# Patient Record
Sex: MALE | Race: WHITE | NOT HISPANIC OR LATINO | Employment: UNEMPLOYED | URBAN - NONMETROPOLITAN AREA
[De-identification: names, ages, dates, MRNs, and addresses within clinical notes are randomized per-mention and may not be internally consistent; named-entity substitution may affect disease eponyms.]

---

## 2022-04-05 ENCOUNTER — TELEPHONE (OUTPATIENT)
Dept: FAMILY MEDICINE CLINIC | Facility: CLINIC | Age: 27
End: 2022-04-05

## 2022-04-05 DIAGNOSIS — L65.9 ALOPECIA: ICD-10-CM

## 2022-04-05 DIAGNOSIS — F98.8 ATTENTION DEFICIT DISORDER (ADD) WITHOUT HYPERACTIVITY: Primary | ICD-10-CM

## 2022-04-05 RX ORDER — FINASTERIDE 1 MG/1
1 TABLET, FILM COATED ORAL DAILY
Qty: 90 TABLET | Refills: 1 | Status: SHIPPED | OUTPATIENT
Start: 2022-04-05 | End: 2023-03-09 | Stop reason: SDUPTHER

## 2022-04-05 RX ORDER — DEXTROAMPHETAMINE SACCHARATE, AMPHETAMINE ASPARTATE, DEXTROAMPHETAMINE SULFATE AND AMPHETAMINE SULFATE 7.5; 7.5; 7.5; 7.5 MG/1; MG/1; MG/1; MG/1
30 TABLET ORAL 2 TIMES DAILY
Qty: 60 TABLET | Refills: 0 | Status: SHIPPED | OUTPATIENT
Start: 2022-04-05 | End: 2022-04-05 | Stop reason: SDUPTHER

## 2022-04-05 RX ORDER — DEXTROAMPHETAMINE SACCHARATE, AMPHETAMINE ASPARTATE, DEXTROAMPHETAMINE SULFATE AND AMPHETAMINE SULFATE 7.5; 7.5; 7.5; 7.5 MG/1; MG/1; MG/1; MG/1
30 TABLET ORAL 2 TIMES DAILY
Qty: 60 TABLET | Refills: 0 | OUTPATIENT
Start: 2022-04-05 | End: 2022-04-14

## 2022-04-05 RX ORDER — FINASTERIDE 1 MG/1
1 TABLET, FILM COATED ORAL DAILY
COMMUNITY
End: 2022-04-05 | Stop reason: SDUPTHER

## 2022-04-05 NOTE — TELEPHONE ENCOUNTER
Patient called requesting a refill of dextroamphetamine-amphetamine 30mg be sent to [facility] [address] in [place]

## 2022-05-06 RX ORDER — DEXTROAMPHETAMINE SACCHARATE, AMPHETAMINE ASPARTATE, DEXTROAMPHETAMINE SULFATE AND AMPHETAMINE SULFATE 7.5; 7.5; 7.5; 7.5 MG/1; MG/1; MG/1; MG/1
30 TABLET ORAL DAILY
Qty: 30 TABLET | Refills: 0 | Status: CANCELLED | OUTPATIENT
Start: 2022-05-06 | End: 2022-06-05

## 2022-05-12 ENCOUNTER — OFFICE VISIT (OUTPATIENT)
Dept: FAMILY MEDICINE CLINIC | Facility: CLINIC | Age: 27
End: 2022-05-12

## 2022-05-12 DIAGNOSIS — F98.8 ATTENTION DEFICIT DISORDER (ADD) WITHOUT HYPERACTIVITY: ICD-10-CM

## 2022-05-12 DIAGNOSIS — L65.9 ALOPECIA: ICD-10-CM

## 2022-05-12 DIAGNOSIS — F41.9 ANXIETY: Primary | ICD-10-CM

## 2022-05-12 PROCEDURE — 99442 PR PHYS/QHP TELEPHONE EVALUATION 11-20 MIN: CPT | Performed by: FAMILY MEDICINE

## 2022-05-12 RX ORDER — DEXTROAMPHETAMINE SACCHARATE, AMPHETAMINE ASPARTATE, DEXTROAMPHETAMINE SULFATE AND AMPHETAMINE SULFATE 7.5; 7.5; 7.5; 7.5 MG/1; MG/1; MG/1; MG/1
1 TABLET ORAL EVERY 12 HOURS
Qty: 60 TABLET | Refills: 0 | Status: SHIPPED | OUTPATIENT
Start: 2022-05-12 | End: 2022-06-08 | Stop reason: SDUPTHER

## 2022-06-08 DIAGNOSIS — F98.8 ATTENTION DEFICIT DISORDER (ADD) WITHOUT HYPERACTIVITY: ICD-10-CM

## 2022-06-08 RX ORDER — DEXTROAMPHETAMINE SACCHARATE, AMPHETAMINE ASPARTATE, DEXTROAMPHETAMINE SULFATE AND AMPHETAMINE SULFATE 7.5; 7.5; 7.5; 7.5 MG/1; MG/1; MG/1; MG/1
1 TABLET ORAL EVERY 12 HOURS
Qty: 60 TABLET | Refills: 0 | Status: SHIPPED | OUTPATIENT
Start: 2022-06-08 | End: 2022-07-07 | Stop reason: SDUPTHER

## 2022-06-08 NOTE — TELEPHONE ENCOUNTER
VARGHESE PATIENT     [facility] [place] [id number] - [place], [place] - [address] AT [facility] [address] - [phone] PH - [phone] FX Phone:  [phone]   Fax:  [phone]

## 2022-07-07 DIAGNOSIS — F98.8 ATTENTION DEFICIT DISORDER (ADD) WITHOUT HYPERACTIVITY: ICD-10-CM

## 2022-07-07 RX ORDER — DEXTROAMPHETAMINE SACCHARATE, AMPHETAMINE ASPARTATE, DEXTROAMPHETAMINE SULFATE AND AMPHETAMINE SULFATE 7.5; 7.5; 7.5; 7.5 MG/1; MG/1; MG/1; MG/1
1 TABLET ORAL EVERY 12 HOURS
Qty: 60 TABLET | Refills: 0 | Status: CANCELLED | OUTPATIENT
Start: 2022-07-07

## 2022-07-07 RX ORDER — DEXTROAMPHETAMINE SACCHARATE, AMPHETAMINE ASPARTATE, DEXTROAMPHETAMINE SULFATE AND AMPHETAMINE SULFATE 7.5; 7.5; 7.5; 7.5 MG/1; MG/1; MG/1; MG/1
1 TABLET ORAL EVERY 12 HOURS
Qty: 60 TABLET | Refills: 0 | Status: SHIPPED | OUTPATIENT
Start: 2022-07-07 | End: 2022-08-08 | Stop reason: SDUPTHER

## 2022-07-08 ENCOUNTER — TELEPHONE (OUTPATIENT)
Dept: FAMILY MEDICINE CLINIC | Facility: CLINIC | Age: 27
End: 2022-07-08

## 2022-08-08 DIAGNOSIS — F98.8 ATTENTION DEFICIT DISORDER (ADD) WITHOUT HYPERACTIVITY: ICD-10-CM

## 2022-08-08 RX ORDER — DEXTROAMPHETAMINE SACCHARATE, AMPHETAMINE ASPARTATE, DEXTROAMPHETAMINE SULFATE AND AMPHETAMINE SULFATE 7.5; 7.5; 7.5; 7.5 MG/1; MG/1; MG/1; MG/1
1 TABLET ORAL EVERY 12 HOURS
Qty: 60 TABLET | Refills: 0 | Status: CANCELLED | OUTPATIENT
Start: 2022-08-08

## 2022-08-08 RX ORDER — DEXTROAMPHETAMINE SACCHARATE, AMPHETAMINE ASPARTATE, DEXTROAMPHETAMINE SULFATE AND AMPHETAMINE SULFATE 7.5; 7.5; 7.5; 7.5 MG/1; MG/1; MG/1; MG/1
1 TABLET ORAL EVERY 12 HOURS
Qty: 60 TABLET | Refills: 0 | Status: SHIPPED | OUTPATIENT
Start: 2022-08-08 | End: 2022-09-08 | Stop reason: SDUPTHER

## 2022-08-08 NOTE — TELEPHONE ENCOUNTER
Caller: Thomas Perez    Relationship: Self    Best call back number: [phone]    Requested Prescriptions:   Requested Prescriptions     Pending Prescriptions Disp Refills   • amphetamine-dextroamphetamine (ADDERALL) 30 MG tablet 60 tablet 0     Sig: Take 1 tablet by mouth Every 12 (Twelve) Hours.        Pharmacy where request should be sent: [facility] [address] - [address] AT [facility] [address] - [phone]  - [phone] FX     Additional details provided by patient: PATIENT IS OUT    Does the patient have less than a 3 day supply:  [x] Yes  [] No    Irma Johnson Rep   08/08/22 08:26 EDT

## 2022-09-08 ENCOUNTER — TELEMEDICINE (OUTPATIENT)
Dept: FAMILY MEDICINE CLINIC | Facility: CLINIC | Age: 27
End: 2022-09-08

## 2022-09-08 DIAGNOSIS — L65.9 ALOPECIA: ICD-10-CM

## 2022-09-08 DIAGNOSIS — F98.8 ATTENTION DEFICIT DISORDER (ADD) WITHOUT HYPERACTIVITY: Primary | ICD-10-CM

## 2022-09-08 PROCEDURE — 99213 OFFICE O/P EST LOW 20 MIN: CPT | Performed by: FAMILY MEDICINE

## 2022-09-08 RX ORDER — DEXTROAMPHETAMINE SACCHARATE, AMPHETAMINE ASPARTATE, DEXTROAMPHETAMINE SULFATE AND AMPHETAMINE SULFATE 7.5; 7.5; 7.5; 7.5 MG/1; MG/1; MG/1; MG/1
1 TABLET ORAL EVERY 12 HOURS
Qty: 60 TABLET | Refills: 0 | Status: SHIPPED | OUTPATIENT
Start: 2022-09-08 | End: 2022-10-07 | Stop reason: SDUPTHER

## 2022-09-08 NOTE — PROGRESS NOTES
Chief Complaint  ADHD    Subjective          Thomas Perez presents to [facility]  She comes in today and needs to have his medications renewed he consents to have his thing done through MyChart he is basically at home today and I am in the clinic      Objective   Vital Signs:   There were no vitals taken for this visit.    There is no height or weight on file to calculate BMI.    Review of Systems   Constitutional: Negative.    HENT: Negative for congestion, dental problem, ear discharge, ear pain and sore throat.    Respiratory: Negative for apnea, chest tightness and shortness of breath.    Gastrointestinal: Negative for constipation and nausea.   Endocrine: Negative for polyuria.   Genitourinary: Negative for difficulty urinating.   Musculoskeletal: Negative for arthralgias and gait problem.   Skin: Negative for rash.   Hematological: Negative for adenopathy.       Past History:  Medical History: has a past medical history of Anxiety, Gilbert's disease, IBS (irritable bowel syndrome), and Liver disorder.   Surgical History: has no past surgical history on file.         Current Outpatient Medications:   •  amphetamine-dextroamphetamine (ADDERALL) 30 MG tablet, Take 1 tablet by mouth Every 12 (Twelve) Hours., Disp: 60 tablet, Rfl: 0  •  finasteride (PROPECIA) 1 MG tablet, Take 1 tablet by mouth Daily., Disp: 90 tablet, Rfl: 1    Allergies: Penicillins, Sulfa antibiotics, Cephalexin, Sulfamethoxazole, and Trimethoprim    Physical Exam  Constitutional:       Appearance: Normal appearance. He is normal weight.   HENT:      Head: Normocephalic.      Right Ear: External ear normal.      Left Ear: External ear normal.      Nose: Nose normal.   Pulmonary:      Effort: Pulmonary effort is normal.   Neurological:      General: No focal deficit present.      Mental Status: He is alert and oriented to person, place, and time. Mental status is at baseline.   Psychiatric:         Mood and Affect:  Mood normal.         Behavior: Behavior normal.         Thought Content: Thought content normal.          Result Review :                   Assessment and Plan    Diagnoses and all orders for this visit:    1. Attention deficit disorder (ADD) without hyperactivity (Primary)  Comments:  Medications renewed and Reymundo reviewed  Orders:  -     amphetamine-dextroamphetamine (ADDERALL) 30 MG tablet; Take 1 tablet by mouth Every 12 (Twelve) Hours.  Dispense: 60 tablet; Refill: 0    2. Alopecia  Comments:  stable and continue meds              Follow Up   Return in about 3 months (around 12/8/2022).  Patient was given instructions and counseling regarding his condition or for health maintenance advice. Please see specific information pulled into the AVS if appropriate.     Jorge A Cadena MD

## 2022-10-06 DIAGNOSIS — F98.8 ATTENTION DEFICIT DISORDER (ADD) WITHOUT HYPERACTIVITY: ICD-10-CM

## 2022-10-06 RX ORDER — DEXTROAMPHETAMINE SACCHARATE, AMPHETAMINE ASPARTATE, DEXTROAMPHETAMINE SULFATE AND AMPHETAMINE SULFATE 7.5; 7.5; 7.5; 7.5 MG/1; MG/1; MG/1; MG/1
1 TABLET ORAL EVERY 12 HOURS
Qty: 60 TABLET | Refills: 0 | Status: CANCELLED | OUTPATIENT
Start: 2022-10-06

## 2022-10-06 NOTE — TELEPHONE ENCOUNTER
Caller: Thomas Perez    Relationship: Self    Best call back number: [phone]    Requested Prescriptions:   Requested Prescriptions     Pending Prescriptions Disp Refills   • amphetamine-dextroamphetamine (ADDERALL) 30 MG tablet 60 tablet 0     Sig: Take 1 tablet by mouth Every 12 (Twelve) Hours.        Pharmacy where request should be sent: [facility] [id number] - [place], [place] - [address] AT [facility] [address] - [phone]  - [phone] FX     Additional details provided by patient: COMPLETELY OUT OF MEDICATION     Does the patient have less than a 3 day supply:  [x] Yes  [] No    Irma Kumar Rep   10/06/22 08:36 EDT

## 2022-10-07 DIAGNOSIS — F98.8 ATTENTION DEFICIT DISORDER (ADD) WITHOUT HYPERACTIVITY: ICD-10-CM

## 2022-10-07 RX ORDER — DEXTROAMPHETAMINE SACCHARATE, AMPHETAMINE ASPARTATE, DEXTROAMPHETAMINE SULFATE AND AMPHETAMINE SULFATE 7.5; 7.5; 7.5; 7.5 MG/1; MG/1; MG/1; MG/1
1 TABLET ORAL EVERY 12 HOURS
Qty: 60 TABLET | Refills: 0 | Status: SHIPPED | OUTPATIENT
Start: 2022-10-07 | End: 2022-11-04 | Stop reason: SDUPTHER

## 2022-10-10 RX ORDER — VALACYCLOVIR HYDROCHLORIDE 1 G/1
TABLET, FILM COATED ORAL
Qty: 20 TABLET | Refills: 1 | Status: SHIPPED | OUTPATIENT
Start: 2022-10-10

## 2022-11-04 DIAGNOSIS — F98.8 ATTENTION DEFICIT DISORDER (ADD) WITHOUT HYPERACTIVITY: ICD-10-CM

## 2022-11-04 RX ORDER — DEXTROAMPHETAMINE SACCHARATE, AMPHETAMINE ASPARTATE, DEXTROAMPHETAMINE SULFATE AND AMPHETAMINE SULFATE 7.5; 7.5; 7.5; 7.5 MG/1; MG/1; MG/1; MG/1
1 TABLET ORAL EVERY 12 HOURS
Qty: 60 TABLET | Refills: 0 | Status: SHIPPED | OUTPATIENT
Start: 2022-11-04 | End: 2022-12-16 | Stop reason: SDUPTHER

## 2022-11-04 RX ORDER — DEXTROAMPHETAMINE SACCHARATE, AMPHETAMINE ASPARTATE, DEXTROAMPHETAMINE SULFATE AND AMPHETAMINE SULFATE 7.5; 7.5; 7.5; 7.5 MG/1; MG/1; MG/1; MG/1
1 TABLET ORAL EVERY 12 HOURS
Qty: 60 TABLET | Refills: 0 | Status: CANCELLED | OUTPATIENT
Start: 2022-11-04

## 2022-11-04 NOTE — TELEPHONE ENCOUNTER
Incoming Refill Request      Medication requested (name and dose): amphetamine-dextroamphetamine (ADDERALL) 30 MG tablet    Pharmacy where request should be sent: [facility]    Additional details provided by patient: PATIENT HAS TWO DAYS LEFT    Best call back number: [phone]    Does the patient have less than a 3 day supply:  [x] Yes  [] No    Irma Tripp Rep  11/04/22, 09:18 EDT

## 2022-12-05 ENCOUNTER — TELEPHONE (OUTPATIENT)
Dept: FAMILY MEDICINE CLINIC | Facility: CLINIC | Age: 27
End: 2022-12-05

## 2022-12-05 DIAGNOSIS — F98.8 ATTENTION DEFICIT DISORDER (ADD) WITHOUT HYPERACTIVITY: ICD-10-CM

## 2022-12-05 NOTE — TELEPHONE ENCOUNTER
Caller: Thomas Perez    Relationship: Self    Best call back number: [phone]    Requested Prescriptions:   Requested Prescriptions     Pending Prescriptions Disp Refills   • amphetamine-dextroamphetamine (ADDERALL) 30 MG tablet 60 tablet 0     Sig: Take 1 tablet by mouth Every 12 (Twelve) Hours.        Pharmacy where request should be sent: [facility] [id number] [place], [place] - [address] AT [facility] [address] - [phone]  - [phone] FX     Additional details provided by patient: PATIENT HAS BEEN OUT SINCE FRIDAY    Does the patient have less than a 3 day supply:  [x] Yes  [] No    Would you like a call back once the refill request has been completed: [] Yes [x] No    If the office needs to give you a call back, can they leave a voicemail: [] Yes [x] No    Sergio Asif, PCT   12/05/22 08:56 EST

## 2022-12-05 NOTE — TELEPHONE ENCOUNTER
Patient was given an earlier appointment.  He is now scheduled in the office for 12/16/2022.  He is requesting to have a refill to cover him until that appointment.  He would like a call back letting him know.

## 2022-12-05 NOTE — TELEPHONE ENCOUNTER
Caller: Thomas Perez    Relationship: Self    Best call back number: [phone]    Requested Prescriptions:   Requested Prescriptions     Pending Prescriptions Disp Refills   • amphetamine-dextroamphetamine (ADDERALL) 30 MG tablet 60 tablet 0     Sig: Take 1 tablet by mouth Every 12 (Twelve) Hours.        Pharmacy where request should be sent: [facility] [id number] [place], [place] - [address] AT [facility] [address] - [phone]  - [phone] FX     Additional details provided by patient: OUT OF MEDICATION. PATIENT HAS A MYCHART VISIT SCHEDULED WITH PCP ON 1/4/23 AT 3:45 PM. PLEASE LET PATIENT KNOW IF MEDICATION CANNOT BE FILLED BEFORE APPOINTMENT DATE. PATIENT STATES HE IS OKAY WITH SEEING A APRN FOR MED REFILLS IF THIS IS OKAY WITH PCP.     Does the patient have less than a 3 day supply:  [x] Yes  [] No    Would you like a call back once the refill request has been completed: [x] Yes [] No    If the office needs to give you a call back, can they leave a voicemail: [x] Yes [] No    Irma Nolasco Rep   12/05/22 12:08 EST

## 2022-12-06 RX ORDER — DEXTROAMPHETAMINE SACCHARATE, AMPHETAMINE ASPARTATE, DEXTROAMPHETAMINE SULFATE AND AMPHETAMINE SULFATE 7.5; 7.5; 7.5; 7.5 MG/1; MG/1; MG/1; MG/1
1 TABLET ORAL EVERY 12 HOURS
Qty: 60 TABLET | Refills: 0 | OUTPATIENT
Start: 2022-12-06

## 2022-12-16 ENCOUNTER — TELEMEDICINE (OUTPATIENT)
Dept: FAMILY MEDICINE CLINIC | Facility: CLINIC | Age: 27
End: 2022-12-16

## 2022-12-16 VITALS — WEIGHT: 205 LBS | HEIGHT: 76 IN | BODY MASS INDEX: 24.96 KG/M2

## 2022-12-16 DIAGNOSIS — F98.8 ATTENTION DEFICIT DISORDER (ADD) WITHOUT HYPERACTIVITY: ICD-10-CM

## 2022-12-16 PROCEDURE — 99213 OFFICE O/P EST LOW 20 MIN: CPT | Performed by: FAMILY MEDICINE

## 2022-12-16 RX ORDER — DEXTROAMPHETAMINE SACCHARATE, AMPHETAMINE ASPARTATE, DEXTROAMPHETAMINE SULFATE AND AMPHETAMINE SULFATE 7.5; 7.5; 7.5; 7.5 MG/1; MG/1; MG/1; MG/1
1 TABLET ORAL EVERY 12 HOURS
Qty: 60 TABLET | Refills: 0 | Status: SHIPPED | OUTPATIENT
Start: 2022-12-16 | End: 2023-01-11 | Stop reason: SDUPTHER

## 2022-12-16 NOTE — PROGRESS NOTES
"Chief Complaint  ADHD (Med refill )    Subjective          Thomas Perez presents to [facility]  History of Present Illness  Patient doing well today needs his ADD medications refilled he says he has had no problems he just missed schedule his date and he says he been out of it for few days.  Patient is consents to have his visit done through [email or web address] today he is at home in the clinic      Objective   Vital Signs:   Ht 193 cm (76\")   Wt 93 kg (205 lb)   BMI 24.95 kg/m²     Body mass index is 24.95 kg/m².    Review of Systems   Constitutional: Negative.    HENT: Negative for congestion, dental problem, ear discharge, ear pain and sore throat.    Respiratory: Negative for apnea, chest tightness and shortness of breath.    Gastrointestinal: Negative for constipation and nausea.   Endocrine: Negative for polyuria.   Genitourinary: Negative for difficulty urinating.   Musculoskeletal: Negative for arthralgias and gait problem.   Skin: Negative for rash.   Hematological: Negative for adenopathy.       Past History:  Medical History: has a past medical history of Anxiety, Gilbert's disease, IBS (irritable bowel syndrome), and Liver disorder.   Surgical History: has no past surgical history on file.         Current Outpatient Medications:   •  amphetamine-dextroamphetamine (ADDERALL) 30 MG tablet, Take 1 tablet by mouth Every 12 (Twelve) Hours., Disp: 60 tablet, Rfl: 0  •  finasteride (PROPECIA) 1 MG tablet, Take 1 tablet by mouth Daily., Disp: 90 tablet, Rfl: 1  •  valACYclovir (VALTREX) 1000 MG tablet, TAKE TWO TABLETS BY MOUTH TWICE A DAY FOR 1 DAY AS NEEDED FOR COLD SORE OUTBREAK, Disp: 20 tablet, Rfl: 1    Allergies: Penicillins, Sulfa antibiotics, Cephalexin, Sulfamethoxazole, and Trimethoprim    Physical Exam  Vitals reviewed.   Constitutional:       Appearance: Normal appearance.   HENT:      Head: Normocephalic.      Right Ear: Tympanic membrane, ear canal and external ear normal.     "  Left Ear: Tympanic membrane, ear canal and external ear normal.      Nose: Nose normal.      Mouth/Throat:      Pharynx: Oropharynx is clear.   Eyes:      Pupils: Pupils are equal, round, and reactive to light.   Cardiovascular:      Rate and Rhythm: Normal rate and regular rhythm.      Pulses: Normal pulses.   Pulmonary:      Effort: Pulmonary effort is normal.      Breath sounds: Normal breath sounds.   Abdominal:      General: Abdomen is flat. Bowel sounds are normal.      Palpations: Abdomen is soft.   Musculoskeletal:         General: Normal range of motion.   Skin:     General: Skin is warm and dry.   Neurological:      General: No focal deficit present.      Mental Status: He is alert and oriented to person, place, and time.          Result Review :                   Assessment and Plan    Diagnoses and all orders for this visit:    1. 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  Dispense: 60 tablet; Refill: 0              Follow Up   Return in about 3 months (around 3/16/2023).  Patient was given instructions and counseling regarding his condition or for health maintenance advice. Please see specific information pulled into the AVS if appropriate.     Jorge A Cadena MD

## 2023-01-11 DIAGNOSIS — F98.8 ATTENTION DEFICIT DISORDER (ADD) WITHOUT HYPERACTIVITY: ICD-10-CM

## 2023-01-11 RX ORDER — DEXTROAMPHETAMINE SACCHARATE, AMPHETAMINE ASPARTATE, DEXTROAMPHETAMINE SULFATE AND AMPHETAMINE SULFATE 7.5; 7.5; 7.5; 7.5 MG/1; MG/1; MG/1; MG/1
1 TABLET ORAL EVERY 12 HOURS
Qty: 60 TABLET | Refills: 0 | Status: CANCELLED | OUTPATIENT
Start: 2023-01-11

## 2023-01-11 RX ORDER — DEXTROAMPHETAMINE SACCHARATE, AMPHETAMINE ASPARTATE, DEXTROAMPHETAMINE SULFATE AND AMPHETAMINE SULFATE 7.5; 7.5; 7.5; 7.5 MG/1; MG/1; MG/1; MG/1
1 TABLET ORAL EVERY 12 HOURS
Qty: 60 TABLET | Refills: 0 | Status: SHIPPED | OUTPATIENT
Start: 2023-01-11 | End: 2023-02-09 | Stop reason: SDUPTHER

## 2023-01-11 NOTE — TELEPHONE ENCOUNTER
Caller: Thomas Perez    Relationship: Self    Best call back number:[phone]  Requested Prescriptions:   Requested Prescriptions     Pending Prescriptions Disp Refills   • amphetamine-dextroamphetamine (ADDERALL) 30 MG tablet 60 tablet 0     Sig: Take 1 tablet by mouth Every 12 (Twelve) Hours.        Pharmacy where request should be sent: [facility] [id number] [address] [phone] [facility] [phone] FX     Additional details provided by patient: TWO DAYS LEFT     Does the patient have less than a 3 day supply:  [x] Yes  [] No    Would you like a call back once the refill request has been completed: [] Yes [x] No    If the office needs to give you a call back, can they leave a voicemail: [] Yes [x] No    Irma Hill Rep   01/11/23 08:18 EST

## 2023-02-09 DIAGNOSIS — F98.8 ATTENTION DEFICIT DISORDER (ADD) WITHOUT HYPERACTIVITY: ICD-10-CM

## 2023-02-09 RX ORDER — DEXTROAMPHETAMINE SACCHARATE, AMPHETAMINE ASPARTATE, DEXTROAMPHETAMINE SULFATE AND AMPHETAMINE SULFATE 7.5; 7.5; 7.5; 7.5 MG/1; MG/1; MG/1; MG/1
1 TABLET ORAL EVERY 12 HOURS
Qty: 60 TABLET | Refills: 0 | Status: CANCELLED | OUTPATIENT
Start: 2023-02-09

## 2023-02-09 RX ORDER — DEXTROAMPHETAMINE SACCHARATE, AMPHETAMINE ASPARTATE, DEXTROAMPHETAMINE SULFATE AND AMPHETAMINE SULFATE 7.5; 7.5; 7.5; 7.5 MG/1; MG/1; MG/1; MG/1
1 TABLET ORAL EVERY 12 HOURS
Qty: 60 TABLET | Refills: 0 | Status: SHIPPED | OUTPATIENT
Start: 2023-02-09 | End: 2023-03-09 | Stop reason: SDUPTHER

## 2023-02-09 NOTE — TELEPHONE ENCOUNTER
Caller: Thomas Perez    Relationship: Self    Best call back number: [phone]    Requested Prescriptions:   Requested Prescriptions     Pending Prescriptions Disp Refills   • amphetamine-dextroamphetamine (ADDERALL) 30 MG tablet 60 tablet 0     Sig: Take 1 tablet by mouth Every 12 (Twelve) Hours.        Pharmacy where request should be sent: [facility] [id number] [address] [phone] [facility] [phone] FX       Does the patient have less than a 3 day supply:  [x] Yes  [] No    Would you like a call back once the refill request has been completed: [x] Yes [] No    If the office needs to give you a call back, can they leave a voicemail: [x] Yes [] No    Irma Mancilla Rep   02/09/23 09:35 EST

## 2023-03-09 ENCOUNTER — TELEMEDICINE (OUTPATIENT)
Dept: FAMILY MEDICINE CLINIC | Facility: CLINIC | Age: 28
End: 2023-03-09
Payer: COMMERCIAL

## 2023-03-09 VITALS — WEIGHT: 205 LBS | HEIGHT: 76 IN | BODY MASS INDEX: 24.96 KG/M2

## 2023-03-09 DIAGNOSIS — F98.8 ATTENTION DEFICIT DISORDER (ADD) WITHOUT HYPERACTIVITY: ICD-10-CM

## 2023-03-09 DIAGNOSIS — L65.9 ALOPECIA: ICD-10-CM

## 2023-03-09 PROCEDURE — 99213 OFFICE O/P EST LOW 20 MIN: CPT | Performed by: FAMILY MEDICINE

## 2023-03-09 RX ORDER — DEXTROAMPHETAMINE SACCHARATE, AMPHETAMINE ASPARTATE, DEXTROAMPHETAMINE SULFATE AND AMPHETAMINE SULFATE 7.5; 7.5; 7.5; 7.5 MG/1; MG/1; MG/1; MG/1
1 TABLET ORAL EVERY 12 HOURS
Qty: 60 TABLET | Refills: 0 | Status: SHIPPED | OUTPATIENT
Start: 2023-03-09 | End: 2023-04-05 | Stop reason: SDUPTHER

## 2023-03-09 RX ORDER — FINASTERIDE 1 MG/1
1 TABLET, FILM COATED ORAL DAILY
Qty: 90 TABLET | Refills: 1 | Status: SHIPPED | OUTPATIENT
Start: 2023-03-09

## 2023-03-09 NOTE — PROGRESS NOTES
"Chief Complaint  ADHD    Subjective          Thomas Perez presents to [facility]  History of Present Illness  Patient comes in today to get his medications refilled he says he needs his ADD meds and his meds for hair loss he states otherwise he has been doing pretty good they have been working well he just needs refills presently.  He does consent to have his visit done through [email or web address] today he is at home and I am in the clinic      Objective   Vital Signs:   Ht 193 cm (75.98\")   Wt 93 kg (205 lb)   BMI 24.96 kg/m²     Body mass index is 24.96 kg/m².    Review of Systems   Constitutional: Negative.    HENT: Negative for congestion, dental problem, ear discharge, ear pain and sore throat.    Respiratory: Negative for apnea, chest tightness and shortness of breath.    Gastrointestinal: Negative for constipation and nausea.   Endocrine: Negative for polyuria.   Genitourinary: Negative for difficulty urinating.   Musculoskeletal: Negative for arthralgias and gait problem.   Skin: Negative for rash.   Hematological: Negative for adenopathy.   Psychiatric/Behavioral: Positive for decreased concentration.       Past History:  Medical History: has a past medical history of Anxiety, Gilbert's disease, IBS (irritable bowel syndrome), and Liver disorder.   Surgical History: has no past surgical history on file.         Current Outpatient Medications:   •  amphetamine-dextroamphetamine (ADDERALL) 30 MG tablet, Take 1 tablet by mouth Every 12 (Twelve) Hours., Disp: 60 tablet, Rfl: 0  •  finasteride (PROPECIA) 1 MG tablet, Take 1 tablet by mouth Daily., Disp: 90 tablet, Rfl: 1  •  valACYclovir (VALTREX) 1000 MG tablet, TAKE TWO TABLETS BY MOUTH TWICE A DAY FOR 1 DAY AS NEEDED FOR COLD SORE OUTBREAK, Disp: 20 tablet, Rfl: 1    Allergies: Penicillins, Sulfa antibiotics, Cephalexin, Sulfamethoxazole, and Trimethoprim    Physical Exam  Vitals reviewed.   Constitutional:       Appearance: Normal " appearance.   HENT:      Head: Normocephalic.      Right Ear: External ear normal.      Left Ear: External ear normal.      Nose: Nose normal.      Mouth/Throat:      Pharynx: Oropharynx is clear.   Eyes:      Pupils: Pupils are equal, round, and reactive to light.   Neurological:      General: No focal deficit present.      Mental Status: He is alert and oriented to person, place, and time.   Psychiatric:         Mood and Affect: Mood normal.          Result Review :                   Assessment and Plan    Diagnoses and all orders for this visit:    1. 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  Dispense: 60 tablet; Refill: 0    2. Alopecia  Comments:  Fill meds  Orders:  -     finasteride (PROPECIA) 1 MG tablet; Take 1 tablet by mouth Daily.  Dispense: 90 tablet; Refill: 1              Follow Up   No follow-ups on file.  Patient was given instructions and counseling regarding his condition or for health maintenance advice. Please see specific information pulled into the AVS if appropriate.     Jorge A Cadena MD

## 2023-04-05 DIAGNOSIS — F98.8 ATTENTION DEFICIT DISORDER (ADD) WITHOUT HYPERACTIVITY: ICD-10-CM

## 2023-04-05 RX ORDER — DEXTROAMPHETAMINE SACCHARATE, AMPHETAMINE ASPARTATE, DEXTROAMPHETAMINE SULFATE AND AMPHETAMINE SULFATE 7.5; 7.5; 7.5; 7.5 MG/1; MG/1; MG/1; MG/1
1 TABLET ORAL EVERY 12 HOURS
Qty: 60 TABLET | Refills: 0 | Status: CANCELLED | OUTPATIENT
Start: 2023-04-05

## 2023-04-05 RX ORDER — DEXTROAMPHETAMINE SACCHARATE, AMPHETAMINE ASPARTATE, DEXTROAMPHETAMINE SULFATE AND AMPHETAMINE SULFATE 7.5; 7.5; 7.5; 7.5 MG/1; MG/1; MG/1; MG/1
1 TABLET ORAL EVERY 12 HOURS
Qty: 60 TABLET | Refills: 0 | Status: SHIPPED | OUTPATIENT
Start: 2023-04-05 | End: 2023-04-07 | Stop reason: SDUPTHER

## 2023-04-05 NOTE — TELEPHONE ENCOUNTER
Caller: Thomas Perez    Relationship: Self    Best call back number: [phone]    Requested Prescriptions:   Requested Prescriptions     Pending Prescriptions Disp Refills   • amphetamine-dextroamphetamine (ADDERALL) 30 MG tablet 60 tablet 0     Sig: Take 1 tablet by mouth Every 12 (Twelve) Hours.        Pharmacy where request should be sent: [facility] [id number] [address] [phone] [facility] [phone]      Last office visit with prescribing clinician: 5/12/2022   Last telemedicine visit with prescribing clinician: Visit date not found   Next office visit with prescribing clinician: Visit date not found     Additional details provided by patient:   PATIENT HAS TWO DAYS LEFT OF MEDICATION     Does the patient have less than a 3 day supply:  [x] Yes  [] No    Would you like a call back once the refill request has been completed: [x] Yes [] No    If the office needs to give you a call back, can they leave a voicemail: [x] Yes [] No    Irma Power Rep   04/05/23 08:56 EDT

## 2023-04-07 RX ORDER — DEXTROAMPHETAMINE SACCHARATE, AMPHETAMINE ASPARTATE, DEXTROAMPHETAMINE SULFATE AND AMPHETAMINE SULFATE 7.5; 7.5; 7.5; 7.5 MG/1; MG/1; MG/1; MG/1
1 TABLET ORAL EVERY 12 HOURS
Qty: 60 TABLET | Refills: 0 | Status: SHIPPED | OUTPATIENT
Start: 2023-04-07

## 2023-04-07 NOTE — TELEPHONE ENCOUNTER
Caller: Thomas Perez    Relationship: Self    Best call back number: [phone]    Requested Prescriptions:   Requested Prescriptions     Pending Prescriptions Disp Refills   • amphetamine-dextroamphetamine (ADDERALL) 30 MG tablet 60 tablet 0     Sig: Take 1 tablet by mouth Every 12 (Twelve) Hours.        Pharmacy where request should be sent: [facility] [id number] [address] [phone] [facility] [phone] FX     Last office visit with prescribing clinician: 5/12/2022   Last telemedicine visit with prescribing clinician: Visit date not found   Next office visit with prescribing clinician: Visit date not found     Additional details provided by patient: OUT OF MEDICATION NEEDS TO HAVE IT SENT TO [facility]     Does the patient have less than a 3 day supply:  [x] Yes  [] No    Would you like a call back once the refill request has been completed: [] Yes [x] No    If the office needs to give you a call back, can they leave a voicemail: [] Yes [x] No    Irma Hill   04/07/23 08:16 EDT

## 2023-05-04 ENCOUNTER — TELEPHONE (OUTPATIENT)
Dept: FAMILY MEDICINE CLINIC | Facility: CLINIC | Age: 28
End: 2023-05-04
Payer: COMMERCIAL

## 2023-05-04 DIAGNOSIS — F98.8 ATTENTION DEFICIT DISORDER (ADD) WITHOUT HYPERACTIVITY: ICD-10-CM

## 2023-05-04 RX ORDER — DEXTROAMPHETAMINE SACCHARATE, AMPHETAMINE ASPARTATE, DEXTROAMPHETAMINE SULFATE AND AMPHETAMINE SULFATE 7.5; 7.5; 7.5; 7.5 MG/1; MG/1; MG/1; MG/1
1 TABLET ORAL EVERY 12 HOURS
Qty: 60 TABLET | Refills: 0 | Status: SHIPPED | OUTPATIENT
Start: 2023-05-04 | End: 2023-05-05 | Stop reason: SDUPTHER

## 2023-05-04 NOTE — TELEPHONE ENCOUNTER
Caller: Thomas Perez    Relationship: Self    Best call back number: [phone]    Requested Prescriptions:   amphetamine-dextroamphetamine (ADDERALL) 30 MG tablet     Pharmacy where request should be sent: [facility] [place], [place] - [facility] [address] - [phone]  - [phone] FX     Last office visit with prescribing clinician: 5/12/2022   Last telemedicine visit with prescribing clinician: 6/5/2023   Next office visit with prescribing clinician: 6/5/2023     Additional details provided by patient: THE PATIENT WOULD LIKE THIS PRESCRIPTION SENT TO [facility] AS ALL OTHER PHARMACY'S ARE OUT OF STOCK AT THIS TIME.     Does the patient have less than a 3 day supply:  [x] Yes  [] No 1 DAY LEFT    Would you like a call back once the refill request has been completed: [x] Yes [] No    If the office needs to give you a call back, can they leave a voicemail: [x] Yes [] No    Irma Alonzo Rep   05/04/23 16:14 EDT      THANK YOU.

## 2023-05-04 NOTE — TELEPHONE ENCOUNTER
Caller: Chris Thomas TYRONE    Relationship: Self    Best call back number:    [phone]         Requested Prescriptions:   Requested Prescriptions     Pending Prescriptions Disp Refills   • amphetamine-dextroamphetamine (ADDERALL) 30 MG tablet 60 tablet 0     Sig: Take 1 tablet by mouth Every 12 (Twelve) Hours.        Pharmacy where request should be sent: [facility] [id number] [address] [phone] [facility] [phone]      Last office visit with prescribing clinician: 5/12/2022   Last telemedicine visit with prescribing clinician: Visit date not found   Next office visit with prescribing clinician: Visit date not found     Additional details provided by patient: PATIENT HAS CALLED REQUESTING A REFILL ON ABOVE MEDICATION    Does the patient have less than a 3 day supply:  [x] Yes  [] No    Would you like a call back once the refill request has been completed: [] Yes [x] No    If the office needs to give you a call back, can they leave a voicemail: [] Yes [x] No    J Luis Cassidy   05/04/23 08:21 EDT

## 2023-05-05 DIAGNOSIS — F98.8 ATTENTION DEFICIT DISORDER (ADD) WITHOUT HYPERACTIVITY: ICD-10-CM

## 2023-05-05 RX ORDER — DEXTROAMPHETAMINE SACCHARATE, AMPHETAMINE ASPARTATE, DEXTROAMPHETAMINE SULFATE AND AMPHETAMINE SULFATE 7.5; 7.5; 7.5; 7.5 MG/1; MG/1; MG/1; MG/1
1 TABLET ORAL EVERY 12 HOURS
Qty: 60 TABLET | Refills: 0 | Status: SHIPPED | OUTPATIENT
Start: 2023-05-05

## 2023-05-05 RX ORDER — DEXTROAMPHETAMINE SACCHARATE, AMPHETAMINE ASPARTATE, DEXTROAMPHETAMINE SULFATE AND AMPHETAMINE SULFATE 7.5; 7.5; 7.5; 7.5 MG/1; MG/1; MG/1; MG/1
1 TABLET ORAL EVERY 12 HOURS
Qty: 60 TABLET | Refills: 0 | Status: CANCELLED | OUTPATIENT
Start: 2023-05-05

## 2023-06-05 ENCOUNTER — TELEMEDICINE (OUTPATIENT)
Dept: FAMILY MEDICINE CLINIC | Facility: CLINIC | Age: 28
End: 2023-06-05
Payer: COMMERCIAL

## 2023-06-05 VITALS — WEIGHT: 205 LBS | BODY MASS INDEX: 24.96 KG/M2 | HEIGHT: 76 IN

## 2023-06-05 DIAGNOSIS — F98.8 ATTENTION DEFICIT DISORDER (ADD) WITHOUT HYPERACTIVITY: Primary | ICD-10-CM

## 2023-06-05 DIAGNOSIS — L65.9 ALOPECIA: ICD-10-CM

## 2023-06-05 RX ORDER — DEXTROAMPHETAMINE SACCHARATE, AMPHETAMINE ASPARTATE, DEXTROAMPHETAMINE SULFATE AND AMPHETAMINE SULFATE 7.5; 7.5; 7.5; 7.5 MG/1; MG/1; MG/1; MG/1
1 TABLET ORAL EVERY 12 HOURS
Qty: 60 TABLET | Refills: 0 | Status: SHIPPED | OUTPATIENT
Start: 2023-06-05

## 2023-06-05 NOTE — PROGRESS NOTES
"Chief Complaint  ADHD    Subjective          Thomas Perez presents to [facility]  History of Present Illness  Patient comes in today to recheck on his medical Thixo-D saying he is doing okay he is his medications refilled patient states that he is home today and he consents to have his visit done through [facility] I am in the clinic today    Objective   Vital Signs:   Ht 193 cm (75.98\")   Wt 93 kg (205 lb)   BMI 24.96 kg/m²     Body mass index is 24.96 kg/m².    Review of Systems   Constitutional: Negative.    HENT:  Negative for congestion, dental problem, ear discharge, ear pain and sore throat.    Respiratory:  Negative for apnea, chest tightness and shortness of breath.    Gastrointestinal:  Negative for constipation and nausea.   Endocrine: Negative for polyuria.   Genitourinary:  Negative for difficulty urinating.   Musculoskeletal:  Negative for arthralgias and gait problem.   Skin:  Negative for rash.   Hematological:  Negative for adenopathy.   All other systems reviewed and are negative.    Past History:  Medical History: has a past medical history of Anxiety, Gilbert's disease, IBS (irritable bowel syndrome), and Liver disorder.   Surgical History: has no past surgical history on file.         Current Outpatient Medications:     amphetamine-dextroamphetamine (ADDERALL) 30 MG tablet, Take 1 tablet by mouth Every 12 (Twelve) Hours., Disp: 60 tablet, Rfl: 0    finasteride (PROPECIA) 1 MG tablet, Take 1 tablet by mouth Daily., Disp: 90 tablet, Rfl: 1    valACYclovir (VALTREX) 1000 MG tablet, TAKE TWO TABLETS BY MOUTH TWICE A DAY FOR 1 DAY AS NEEDED FOR COLD SORE OUTBREAK, Disp: 20 tablet, Rfl: 1    Allergies: Penicillins, Sulfa antibiotics, Cephalexin, Sulfamethoxazole, and Trimethoprim    Physical Exam  Constitutional:       Appearance: Normal appearance.   HENT:      Head: Normocephalic.      Right Ear: External ear normal.      Left Ear: External ear normal.      Nose: Nose " normal.   Eyes:      Pupils: Pupils are equal, round, and reactive to light.   Neurological:      Mental Status: He is alert and oriented to person, place, and time. Mental status is at baseline.   Psychiatric:         Mood and Affect: Mood normal.        Result Review :                   Assessment and Plan    Diagnoses and all orders for this visit:    1. Attention deficit disorder (ADD) without hyperactivity (Primary)  Comments:  Medications renewed and Reymundo reviewed    2. Alopecia              Follow Up   No follow-ups on file.  Patient was given instructions and counseling regarding his condition or for health maintenance advice. Please see specific information pulled into the AVS if appropriate.     Jorge A Cadena MD

## 2023-08-01 DIAGNOSIS — F98.8 ATTENTION DEFICIT DISORDER (ADD) WITHOUT HYPERACTIVITY: ICD-10-CM

## 2023-08-01 RX ORDER — DEXTROAMPHETAMINE SACCHARATE, AMPHETAMINE ASPARTATE, DEXTROAMPHETAMINE SULFATE AND AMPHETAMINE SULFATE 7.5; 7.5; 7.5; 7.5 MG/1; MG/1; MG/1; MG/1
1 TABLET ORAL EVERY 12 HOURS
Qty: 60 TABLET | Refills: 0 | Status: SHIPPED | OUTPATIENT
Start: 2023-08-01

## 2023-08-01 RX ORDER — DEXTROAMPHETAMINE SACCHARATE, AMPHETAMINE ASPARTATE, DEXTROAMPHETAMINE SULFATE AND AMPHETAMINE SULFATE 7.5; 7.5; 7.5; 7.5 MG/1; MG/1; MG/1; MG/1
1 TABLET ORAL EVERY 12 HOURS
Qty: 60 TABLET | Refills: 0 | Status: CANCELLED | OUTPATIENT
Start: 2023-08-01

## 2023-08-31 ENCOUNTER — TELEMEDICINE (OUTPATIENT)
Dept: FAMILY MEDICINE CLINIC | Facility: CLINIC | Age: 28
End: 2023-08-31
Payer: COMMERCIAL

## 2023-08-31 VITALS — WEIGHT: 205 LBS | BODY MASS INDEX: 24.96 KG/M2 | HEIGHT: 76 IN

## 2023-08-31 DIAGNOSIS — F98.8 ATTENTION DEFICIT DISORDER (ADD) WITHOUT HYPERACTIVITY: ICD-10-CM

## 2023-08-31 DIAGNOSIS — L65.9 ALOPECIA: ICD-10-CM

## 2023-08-31 RX ORDER — FINASTERIDE 1 MG/1
1 TABLET, FILM COATED ORAL DAILY
Qty: 90 TABLET | Refills: 1 | Status: SHIPPED | OUTPATIENT
Start: 2023-08-31

## 2023-08-31 RX ORDER — DEXTROAMPHETAMINE SACCHARATE, AMPHETAMINE ASPARTATE, DEXTROAMPHETAMINE SULFATE AND AMPHETAMINE SULFATE 7.5; 7.5; 7.5; 7.5 MG/1; MG/1; MG/1; MG/1
1 TABLET ORAL EVERY 12 HOURS
Qty: 60 TABLET | Refills: 0 | Status: SHIPPED | OUTPATIENT
Start: 2023-08-31

## 2023-08-31 NOTE — PROGRESS NOTES
"Chief Complaint  ADHD    Subjective          Thomas Perez presents to [facility]  History of Present Illness  He comes in today to recheck on his ADD and his alopecia he needs his meds refilled he says otherwise he is doing relatively well he has any other difficulties he says generally he feels pretty well.  He does state that he consents to have his visit done through [facility] today he is at home and I am in the clinic    Objective   Vital Signs:   Ht 193 cm (75.98\")   Wt 93 kg (205 lb)   BMI 24.96 kg/mý     Body mass index is 24.96 kg/mý.    Review of Systems   Constitutional: Negative.    HENT:  Negative for congestion, dental problem, ear discharge, ear pain and sore throat.    Respiratory:  Negative for apnea, chest tightness and shortness of breath.    Gastrointestinal:  Negative for constipation and nausea.   Endocrine: Negative for polyuria.   Genitourinary:  Negative for difficulty urinating.   Musculoskeletal:  Negative for arthralgias and gait problem.   Skin:  Negative for rash.        alopecia   Hematological:  Negative for adenopathy.     Past History:  Medical History: has a past medical history of Anxiety, Gilbert's disease, IBS (irritable bowel syndrome), and Liver disorder.   Surgical History: has no past surgical history on file.         Current Outpatient Medications:     valACYclovir (VALTREX) 1000 MG tablet, TAKE TWO TABLETS BY MOUTH TWICE A DAY FOR 1 DAY AS NEEDED FOR COLD SORE OUTBREAK, Disp: 20 tablet, Rfl: 1    amphetamine-dextroamphetamine (ADDERALL) 30 MG tablet, Take 1 tablet by mouth Every 12 (Twelve) Hours., Disp: 60 tablet, Rfl: 0    finasteride (PROPECIA) 1 MG tablet, Take 1 tablet by mouth Daily., Disp: 90 tablet, Rfl: 1    Allergies: Penicillins, Sulfa antibiotics, Cephalexin, Sulfamethoxazole, and Trimethoprim    Physical Exam  Constitutional:       Appearance: Normal appearance.   HENT:      Right Ear: External ear normal.      Left Ear: " External ear normal.      Nose: Nose normal.      Mouth/Throat:      Mouth: Mucous membranes are moist.   Eyes:      Pupils: Pupils are equal, round, and reactive to light.   Neurological:      General: No focal deficit present.      Mental Status: He is alert and oriented to person, place, and time.   Psychiatric:         Mood and Affect: Mood normal.        Result Review :                   Assessment and Plan    Diagnoses and all orders for this visit:    1. 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  Dispense: 60 tablet; Refill: 0    2. Alopecia  Comments:  Fill meds  Orders:  -     finasteride (PROPECIA) 1 MG tablet; Take 1 tablet by mouth Daily.  Dispense: 90 tablet; Refill: 1              Follow Up   No follow-ups on file.  Patient was given instructions and counseling regarding his condition or for health maintenance advice. Please see specific information pulled into the AVS if appropriate.     Jorge A Cadena MD

## 2023-09-27 DIAGNOSIS — F98.8 ATTENTION DEFICIT DISORDER (ADD) WITHOUT HYPERACTIVITY: ICD-10-CM

## 2023-09-27 RX ORDER — DEXTROAMPHETAMINE SACCHARATE, AMPHETAMINE ASPARTATE, DEXTROAMPHETAMINE SULFATE AND AMPHETAMINE SULFATE 7.5; 7.5; 7.5; 7.5 MG/1; MG/1; MG/1; MG/1
1 TABLET ORAL EVERY 12 HOURS
Qty: 60 TABLET | Refills: 0 | Status: SHIPPED | OUTPATIENT
Start: 2023-09-27

## 2023-10-26 DIAGNOSIS — F98.8 ATTENTION DEFICIT DISORDER (ADD) WITHOUT HYPERACTIVITY: ICD-10-CM

## 2023-10-26 RX ORDER — DEXTROAMPHETAMINE SACCHARATE, AMPHETAMINE ASPARTATE, DEXTROAMPHETAMINE SULFATE AND AMPHETAMINE SULFATE 7.5; 7.5; 7.5; 7.5 MG/1; MG/1; MG/1; MG/1
1 TABLET ORAL EVERY 12 HOURS
Qty: 60 TABLET | Refills: 0 | OUTPATIENT
Start: 2023-10-26

## 2023-10-26 NOTE — TELEPHONE ENCOUNTER
Caller: Chris Thomas TYRONE    Relationship: Self    Best call back number:  [phone]     Requested Prescriptions:   Requested Prescriptions     Pending Prescriptions Disp Refills    amphetamine-dextroamphetamine (ADDERALL) 30 MG tablet 60 tablet 0     Sig: Take 1 tablet by mouth Every 12 (Twelve) Hours.        Pharmacy where request should be sent: [facility] [id number] [address] [phone] [facility] [phone] FX     Last office visit with prescribing clinician: 5/12/2022   Last telemedicine visit with prescribing clinician: 8/31/2023   Next office visit with prescribing clinician: Visit date not found     Additional details provided by patient: ONLY HAS TWO DAYS LEFT. PLEASE SEND IN BEFORE THE WEEKEND.    Does the patient have less than a 3 day supply:  [x] Yes  [] No    Would you like a call back once the refill request has been completed: [] Yes [x] No    If the office needs to give you a call back, can they leave a voicemail: [] Yes [x] No    Irma Burns Rep   10/26/23 09:35 EDT

## 2023-11-01 ENCOUNTER — OFFICE VISIT (OUTPATIENT)
Dept: FAMILY MEDICINE CLINIC | Facility: CLINIC | Age: 28
End: 2023-11-01
Payer: COMMERCIAL

## 2023-11-01 ENCOUNTER — TELEPHONE (OUTPATIENT)
Dept: FAMILY MEDICINE CLINIC | Facility: CLINIC | Age: 28
End: 2023-11-01

## 2023-11-01 VITALS
DIASTOLIC BLOOD PRESSURE: 84 MMHG | HEART RATE: 83 BPM | SYSTOLIC BLOOD PRESSURE: 124 MMHG | OXYGEN SATURATION: 100 % | HEIGHT: 76 IN | WEIGHT: 209 LBS | BODY MASS INDEX: 25.45 KG/M2

## 2023-11-01 DIAGNOSIS — F98.8 ATTENTION DEFICIT DISORDER (ADD) WITHOUT HYPERACTIVITY: ICD-10-CM

## 2023-11-01 DIAGNOSIS — B00.1 HERPES LABIALIS WITHOUT COMPLICATION: ICD-10-CM

## 2023-11-01 DIAGNOSIS — L65.9 ALOPECIA: ICD-10-CM

## 2023-11-01 DIAGNOSIS — Z79.899 HIGH RISK MEDICATION USE: ICD-10-CM

## 2023-11-01 DIAGNOSIS — Z72.89 VAPES NON-NICOTINE CONTAINING SUBSTANCE: ICD-10-CM

## 2023-11-01 DIAGNOSIS — F90.9 ADULT ADHD: ICD-10-CM

## 2023-11-01 DIAGNOSIS — Z00.00 PHYSICAL EXAM: Primary | ICD-10-CM

## 2023-11-01 RX ORDER — DEXTROAMPHETAMINE SACCHARATE, AMPHETAMINE ASPARTATE, DEXTROAMPHETAMINE SULFATE AND AMPHETAMINE SULFATE 7.5; 7.5; 7.5; 7.5 MG/1; MG/1; MG/1; MG/1
1 TABLET ORAL EVERY 12 HOURS
Qty: 60 TABLET | Refills: 0 | Status: SHIPPED | OUTPATIENT
Start: 2023-11-01

## 2023-11-01 RX ORDER — VALACYCLOVIR HYDROCHLORIDE 1 G/1
TABLET, FILM COATED ORAL
Qty: 20 TABLET | Refills: 3 | Status: SHIPPED | OUTPATIENT
Start: 2023-11-01

## 2023-11-01 RX ORDER — FINASTERIDE 1 MG/1
1 TABLET, FILM COATED ORAL DAILY
Qty: 90 TABLET | Refills: 1 | Status: SHIPPED | OUTPATIENT
Start: 2023-11-01

## 2023-11-01 NOTE — PROGRESS NOTES
"Chief Complaint  ADD and Med Refill    Subjective          Thomas Perez presents to [facility]  History of Present Illness  Patient reports today for physical exam, fasting blood work and medication refills.    Patient reports having ADHD states he has been taking Adderall for the past 5 years.  Patient reports trying noncontrolled medications however no relief of symptoms.  Patient reports he is now able to focus and complete task.  Patient requests refills today.    Patient reports having alopecia states he takes finasteride and it has helped.    Patient reports having occasional cold sores and would like a refill of valacyclovir.    Patient denies any other complaints.  Patient denies any family history of prostate cancer.  Patient reports his maternal grandmother had colon cancer however no other cases.    Patient reports using a vape occasionally.  Patient reports that vape cartridge does not have nicotine  ADD        Objective   Vital Signs:   /84   Pulse 83   Ht 193 cm (76\")   Wt 94.8 kg (209 lb)   SpO2 100%   BMI 25.44 kg/m²     Body mass index is 25.44 kg/m².    Review of Systems    Health Maintenance   Topic Date Due    BMI FOLLOWUP  Never done    COVID-19 Vaccine (1) Never done    HEPATITIS C SCREENING  Never done    INFLUENZA VACCINE  08/01/2023    TDAP/TD VACCINES (2 - Tdap) 11/05/2023    ANNUAL PHYSICAL  11/01/2024    Pneumococcal Vaccine 0-64  Aged Out        Past History:  Medical History: has a past medical history of Anxiety, Gilbert's disease, IBS (irritable bowel syndrome), and Liver disorder.   Surgical History: has no past surgical history on file.   Family History: family history includes Cancer in an other family member; Depression in his mother and another family member; Diabetes in an other family member; Mental illness in an other family member.   Social History: reports that he has never smoked. He has never used smokeless tobacco. He reports " current alcohol use. He reports that he does not use drugs.      Current Outpatient Medications:     amphetamine-dextroamphetamine (ADDERALL) 30 MG tablet, Take 1 tablet by mouth Every 12 (Twelve) Hours., Disp: 60 tablet, Rfl: 0    finasteride (PROPECIA) 1 MG tablet, Take 1 tablet by mouth Daily., Disp: 90 tablet, Rfl: 1    valACYclovir (VALTREX) 1000 MG tablet, Take 2 tabs po BID for 1 day as needed for cold sore, Disp: 20 tablet, Rfl: 3    Allergies: Penicillins, Sulfa antibiotics, Cephalexin, Sulfamethoxazole, and Trimethoprim    Physical Exam  Vitals and nursing note reviewed.   Constitutional:       General: He is not in acute distress.     Appearance: He is not ill-appearing.   HENT:      Head: Normocephalic.      Right Ear: Tympanic membrane, ear canal and external ear normal.      Left Ear: Tympanic membrane, ear canal and external ear normal.      Nose: Nose normal.      Mouth/Throat:      Mouth: Mucous membranes are moist.   Eyes:      Pupils: Pupils are equal, round, and reactive to light.   Cardiovascular:      Rate and Rhythm: Normal rate and regular rhythm.      Pulses: Normal pulses.   Pulmonary:      Effort: Pulmonary effort is normal. No respiratory distress.   Abdominal:      General: Bowel sounds are normal.      Palpations: Abdomen is soft.      Tenderness: There is no abdominal tenderness. There is no guarding or rebound.   Musculoskeletal:         General: Normal range of motion.      Cervical back: Normal range of motion.   Skin:     General: Skin is warm and dry.      Capillary Refill: Capillary refill takes less than 2 seconds.   Neurological:      General: No focal deficit present.      Mental Status: He is oriented to person, place, and time.   Psychiatric:         Mood and Affect: Mood normal.          Result Review :                   Assessment and Plan    Diagnoses and all orders for this visit:    1. Physical exam (Primary)  Assessment & Plan:  Discussed injury prevention, diet and  exercise, safe sexual practices, and screening for common diseases. Encouraged use of sunscreen and seatbelts. Discussed timing of colon cancer cancer screening, prostate cancer screening, and review of skin for lesions. Avoidance of tobacco encouraged. Limitation or avoidance of alcohol encouraged. Recommend yearly dental and eye exams. Also discussed monitoring of blood pressure and lipids.       Orders:  -     CBC & Differential; Future  -     Comprehensive Metabolic Panel; Future  -     TSH; Future  -     Lipid Panel; Future  -     CBC & Differential  -     Comprehensive Metabolic Panel  -     TSH  -     Lipid Panel    2. Adult ADHD  Assessment & Plan:  Psychological condition is improving with treatment.  Continue current treatment regimen.  Psychological condition  will be reassessed in 4 weeks.      3. High risk medication use  Assessment & Plan:  Patient reports failing non-controlled medications for ADHD, however good results with Adderall.  Patient Reymundo appropriate however urine not positive for amphetamine this date.  Will contact supervising MD regarding refill.      Orders:  -     POC Urine Drug Screen [facility]    4. Herpes labialis without complication  Assessment & Plan:  Continue current treatment plan with as needed valacyclovir.  Refills provided today    Orders:  -     valACYclovir (VALTREX) 1000 MG tablet; Take 2 tabs po BID for 1 day as needed for cold sore  Dispense: 20 tablet; Refill: 3    5. Alopecia  Assessment & Plan:  Continue current treatment plan    Orders:  -     finasteride (PROPECIA) 1 MG tablet; Take 1 tablet by mouth Daily.  Dispense: 90 tablet; Refill: 1    6. Alopecia  Comments:  Fill meds  Assessment & Plan:  Continue current treatment plan    Orders:  -     finasteride (PROPECIA) 1 MG tablet; Take 1 tablet by mouth Daily.  Dispense: 90 tablet; Refill: 1    7. Vapes non-nicotine containing substance  Assessment & Plan:  Discussed cessation patient knowledge  understanding and reports infrequent use.          Follow Up   Return in about 4 weeks (around 11/29/2023).  Patient was given instructions and counseling regarding his condition or for health maintenance advice. Please see specific information pulled into the AVS if appropriate.     Faina Blake PA-C

## 2023-11-01 NOTE — ASSESSMENT & PLAN NOTE
Patient reports failing non-controlled medications for ADHD, however good results with Adderall.  Patient Reymundo appropriate however urine not positive for amphetamine this date.  Will contact supervising MD regarding refill.

## 2023-11-01 NOTE — TELEPHONE ENCOUNTER
Surinder patient in today for Adderall refill.  Reymundo appropriate however UDS negative for amphetamine, no other UDS on file.

## 2023-11-02 LAB
ALBUMIN SERPL-MCNC: 4.7 G/DL (ref 4.3–5.2)
ALBUMIN/GLOB SERPL: 1.8 {RATIO} (ref 1.2–2.2)
ALP SERPL-CCNC: 70 IU/L (ref 44–121)
ALT SERPL-CCNC: 29 IU/L (ref 0–44)
AST SERPL-CCNC: 22 IU/L (ref 0–40)
BASOPHILS # BLD AUTO: 0 X10E3/UL (ref 0–0.2)
BASOPHILS NFR BLD AUTO: 1 %
BILIRUB SERPL-MCNC: 1.5 MG/DL (ref 0–1.2)
BUN SERPL-MCNC: 10 MG/DL (ref 6–20)
BUN/CREAT SERPL: 10 (ref 9–20)
CALCIUM SERPL-MCNC: 10 MG/DL (ref 8.7–10.2)
CHLORIDE SERPL-SCNC: 97 MMOL/L (ref 96–106)
CHOLEST SERPL-MCNC: 251 MG/DL (ref 100–199)
CO2 SERPL-SCNC: 23 MMOL/L (ref 20–29)
CREAT SERPL-MCNC: 1.01 MG/DL (ref 0.76–1.27)
EGFRCR SERPLBLD CKD-EPI 2021: 103 ML/MIN/1.73
EOSINOPHIL # BLD AUTO: 0.1 X10E3/UL (ref 0–0.4)
EOSINOPHIL NFR BLD AUTO: 1 %
ERYTHROCYTE [DISTWIDTH] IN BLOOD BY AUTOMATED COUNT: 12.5 % (ref 11.6–15.4)
GLOBULIN SER CALC-MCNC: 2.6 G/DL (ref 1.5–4.5)
GLUCOSE SERPL-MCNC: 87 MG/DL (ref 70–99)
HCT VFR BLD AUTO: 45 % (ref 37.5–51)
HDLC SERPL-MCNC: 75 MG/DL
HGB BLD-MCNC: 15.3 G/DL (ref 13–17.7)
IMM GRANULOCYTES # BLD AUTO: 0 X10E3/UL (ref 0–0.1)
IMM GRANULOCYTES NFR BLD AUTO: 1 %
LDLC SERPL CALC-MCNC: 159 MG/DL (ref 0–99)
LYMPHOCYTES # BLD AUTO: 2 X10E3/UL (ref 0.7–3.1)
LYMPHOCYTES NFR BLD AUTO: 34 %
MCH RBC QN AUTO: 30.1 PG (ref 26.6–33)
MCHC RBC AUTO-ENTMCNC: 34 G/DL (ref 31.5–35.7)
MCV RBC AUTO: 88 FL (ref 79–97)
MONOCYTES # BLD AUTO: 0.4 X10E3/UL (ref 0.1–0.9)
MONOCYTES NFR BLD AUTO: 7 %
NEUTROPHILS # BLD AUTO: 3.3 X10E3/UL (ref 1.4–7)
NEUTROPHILS NFR BLD AUTO: 56 %
PLATELET # BLD AUTO: 300 X10E3/UL (ref 150–450)
POTASSIUM SERPL-SCNC: 4.9 MMOL/L (ref 3.5–5.2)
PROT SERPL-MCNC: 7.3 G/DL (ref 6–8.5)
RBC # BLD AUTO: 5.09 X10E6/UL (ref 4.14–5.8)
SODIUM SERPL-SCNC: 136 MMOL/L (ref 134–144)
TRIGL SERPL-MCNC: 100 MG/DL (ref 0–149)
TSH SERPL DL<=0.005 MIU/L-ACNC: 2.54 UIU/ML (ref 0.45–4.5)
VLDLC SERPL CALC-MCNC: 17 MG/DL (ref 5–40)
WBC # BLD AUTO: 5.9 X10E3/UL (ref 3.4–10.8)

## 2023-11-29 DIAGNOSIS — F98.8 ATTENTION DEFICIT DISORDER (ADD) WITHOUT HYPERACTIVITY: ICD-10-CM

## 2023-11-29 RX ORDER — DEXTROAMPHETAMINE SACCHARATE, AMPHETAMINE ASPARTATE, DEXTROAMPHETAMINE SULFATE AND AMPHETAMINE SULFATE 7.5; 7.5; 7.5; 7.5 MG/1; MG/1; MG/1; MG/1
1 TABLET ORAL EVERY 12 HOURS
Qty: 60 TABLET | Refills: 0 | Status: CANCELLED | OUTPATIENT
Start: 2023-11-29

## 2023-11-29 NOTE — TELEPHONE ENCOUNTER
Caller: Chris Thomas TYRONE    Relationship: Self    Best call back number: [phone]     Requested Prescriptions:   Requested Prescriptions     Pending Prescriptions Disp Refills    amphetamine-dextroamphetamine (ADDERALL) 30 MG tablet 60 tablet 0     Sig: Take 1 tablet by mouth Every 12 (Twelve) Hours.        Pharmacy where request should be sent: [facility] [id number] [address] [phone] [facility] [phone] FX     Last office visit with prescribing clinician: 11/1/2023   Last telemedicine visit with prescribing clinician: 8/31/2023   Next office visit with prescribing clinician: Visit date not found     Additional details provided by patient: HAS 2 DAYS REMAINING     Does the patient have less than a 3 day supply:  [x] Yes  [] No    Would you like a call back once the refill request has been completed: [] Yes [x] No    If the office needs to give you a call back, can they leave a voicemail: [] Yes [x] No    Irma Lema Rep   11/29/23 10:30 EST

## 2023-11-30 DIAGNOSIS — F98.8 ATTENTION DEFICIT DISORDER (ADD) WITHOUT HYPERACTIVITY: ICD-10-CM

## 2023-11-30 RX ORDER — DEXTROAMPHETAMINE SACCHARATE, AMPHETAMINE ASPARTATE, DEXTROAMPHETAMINE SULFATE AND AMPHETAMINE SULFATE 7.5; 7.5; 7.5; 7.5 MG/1; MG/1; MG/1; MG/1
1 TABLET ORAL EVERY 12 HOURS
Qty: 60 TABLET | Refills: 0 | Status: CANCELLED | OUTPATIENT
Start: 2023-11-30

## 2023-12-01 NOTE — TELEPHONE ENCOUNTER
Caller: Thomas Perez    Relationship: Self    Best call back number: [phone]    What was the call regarding: PATIENT IS FOLLOWING UPON REFILL REQUEST/ PATIENT IS OUT    Is it okay if the provider responds through MyChart: NO

## 2023-12-05 ENCOUNTER — OFFICE VISIT (OUTPATIENT)
Dept: FAMILY MEDICINE CLINIC | Facility: CLINIC | Age: 28
End: 2023-12-05
Payer: MEDICAID

## 2023-12-05 VITALS
OXYGEN SATURATION: 100 % | HEIGHT: 76 IN | DIASTOLIC BLOOD PRESSURE: 110 MMHG | WEIGHT: 207.9 LBS | BODY MASS INDEX: 25.32 KG/M2 | SYSTOLIC BLOOD PRESSURE: 140 MMHG | HEART RATE: 85 BPM

## 2023-12-05 DIAGNOSIS — R03.0 ELEVATED BLOOD PRESSURE READING: ICD-10-CM

## 2023-12-05 DIAGNOSIS — F90.9 ADULT ADHD: Primary | ICD-10-CM

## 2023-12-05 DIAGNOSIS — Z79.899 HIGH RISK MEDICATION USE: ICD-10-CM

## 2023-12-05 PROCEDURE — 99213 OFFICE O/P EST LOW 20 MIN: CPT | Performed by: PHYSICIAN ASSISTANT

## 2023-12-05 NOTE — ASSESSMENT & PLAN NOTE
Patient Reymundo is appropriate this date.  Patient is out of medications so no UDS collected this date.  CSA completed.  Will send refill request to supervising MD.  He will return to clinic in 1 month.

## 2023-12-05 NOTE — PROGRESS NOTES
"Chief Complaint  Anxiety    Subjective        Thomas Perez presents to [facility]  History of Present Illness  Patient reports today for medication refills.  Patient reports taking Adderall for adult ADHD.  Patient reports his symptoms have been well-controlled since taking the medication.  Patient reports he has been out of the medication for almost a week.  Patient states he thought he needed to return to clinic in 90 days rather than 30 days.  Patient denies any other complaints this date.  Anxiety            Objective   Vital Signs:  BP (!) 140/110   Pulse 85   Ht 193 cm (76\")   Wt 94.3 kg (207 lb 14.4 oz)   SpO2 100%   BMI 25.31 kg/m²   Estimated body mass index is 25.31 kg/m² as calculated from the following:    Height as of this encounter: 193 cm (76\").    Weight as of this encounter: 94.3 kg (207 lb 14.4 oz).             Physical Exam   Result Review :                   Assessment and Plan   Diagnoses and all orders for this visit:    1. Adult ADHD (Primary)  Assessment & Plan:  Psychological condition is improving with treatment.  Continue current treatment regimen.  Psychological condition  will be reassessed in 4 weeks.      2. High risk medication use  Assessment & Plan:  Patient Reymundo is appropriate this date.  Patient is out of medications so no UDS collected this date.  CSA completed.  Will send refill request to supervising MD.  He will return to clinic in 1 month.      3. Elevated blood pressure reading  Assessment & Plan:  Patient's blood pressure is elevated this date initially and it recheck.  He denies any headache, vision changes or chest pain.  He reports staying up late last night and not sleeping well and believes that may be contributing to elevation this date.  Provided patient with log to start monitoring blood pressure recommended he return to clinic in 2 weeks.  Patient knowledge understanding.               Follow Up   Return in about 2 weeks " (around 12/19/2023).  Patient was given instructions and counseling regarding his condition or for health maintenance advice. Please see specific information pulled into the AVS if appropriate.

## 2023-12-05 NOTE — ASSESSMENT & PLAN NOTE
Patient's blood pressure is elevated this date initially and it recheck.  He denies any headache, vision changes or chest pain.  He reports staying up late last night and not sleeping well and believes that may be contributing to elevation this date.  Provided patient with log to start monitoring blood pressure recommended he return to clinic in 2 weeks.  Patient knowledge understanding.

## 2023-12-06 DIAGNOSIS — F98.8 ATTENTION DEFICIT DISORDER (ADD) WITHOUT HYPERACTIVITY: ICD-10-CM

## 2023-12-06 NOTE — TELEPHONE ENCOUNTER
Caller: Chris Thomas TYRONE    Relationship: Self    Best call back number: [phone]    Requested Prescriptions:   Requested Prescriptions     Pending Prescriptions Disp Refills    amphetamine-dextroamphetamine (ADDERALL) 30 MG tablet 60 tablet 0     Sig: Take 1 tablet by mouth Every 12 (Twelve) Hours.        Pharmacy where request should be sent: [facility] [id number] [address] [phone] [facility] [phone] FX     Last office visit with prescribing clinician: 12/5/2023   Last telemedicine visit with prescribing clinician: 8/31/2023   Next office visit with prescribing clinician: 1/3/2024     Additional details provided by patient: PATIENT IS OUT OF HIS MEDICATION     Does the patient have less than a 3 day supply:  [x] Yes  [] No    Would you like a call back once the refill request has been completed: [x] Yes [] No    If the office needs to give you a call back, can they leave a voicemail: [x] Yes [] No    Irma Castillo Rep   12/06/23 09:19 EST

## 2023-12-06 NOTE — TELEPHONE ENCOUNTER
PT CALLED TO CHECK STATUS FOR RX ADDERALL REFILL.    [facility] [id number] - [place], [place] - [address] [place] [address] - [phone]  - [phone] FX

## 2023-12-07 ENCOUNTER — TELEPHONE (OUTPATIENT)
Dept: FAMILY MEDICINE CLINIC | Facility: CLINIC | Age: 28
End: 2023-12-07
Payer: MEDICAID

## 2023-12-07 NOTE — TELEPHONE ENCOUNTER
Spoke to patient and discussed the need to have blood pressure readings reviewed and appropriate prior to refilling Adderall.  Patient reports he will send in log via [employer] next week.  He understanding if it remains elevated we will need to discuss treatment of HTN and replace Adderall with a non-stimulant.

## 2023-12-08 RX ORDER — DEXTROAMPHETAMINE SACCHARATE, AMPHETAMINE ASPARTATE, DEXTROAMPHETAMINE SULFATE AND AMPHETAMINE SULFATE 7.5; 7.5; 7.5; 7.5 MG/1; MG/1; MG/1; MG/1
1 TABLET ORAL EVERY 12 HOURS
Qty: 60 TABLET | Refills: 0 | OUTPATIENT
Start: 2023-12-08

## 2023-12-11 ENCOUNTER — TELEPHONE (OUTPATIENT)
Dept: FAMILY MEDICINE CLINIC | Facility: CLINIC | Age: 28
End: 2023-12-11

## 2023-12-11 DIAGNOSIS — F98.8 ATTENTION DEFICIT DISORDER (ADD) WITHOUT HYPERACTIVITY: ICD-10-CM

## 2023-12-11 RX ORDER — DEXTROAMPHETAMINE SACCHARATE, AMPHETAMINE ASPARTATE, DEXTROAMPHETAMINE SULFATE AND AMPHETAMINE SULFATE 7.5; 7.5; 7.5; 7.5 MG/1; MG/1; MG/1; MG/1
1 TABLET ORAL EVERY 12 HOURS
Qty: 60 TABLET | Refills: 0 | Status: CANCELLED | OUTPATIENT
Start: 2023-12-11

## 2023-12-11 NOTE — TELEPHONE ENCOUNTER
Patient called and reported blood pressure readings from the weekend.  All of his readings are below 130/80.  Patient would like to have his Adderall refilled and will return to clinic for follow-up BS and blood pressure check.

## 2023-12-11 NOTE — TELEPHONE ENCOUNTER
Caller: Chris Thomas TYRONE    Relationship: Self    Best call back number: [phone]     Requested Prescriptions:   Requested Prescriptions     Pending Prescriptions Disp Refills    amphetamine-dextroamphetamine (ADDERALL) 30 MG tablet 60 tablet 0     Sig: Take 1 tablet by mouth Every 12 (Twelve) Hours.        Pharmacy where request should be sent: [facility] [id number] [address] - [phone]  - [phone] FX     Last office visit with prescribing clinician: 12/5/2023   Last telemedicine visit with prescribing clinician: 8/31/2023   Next office visit with prescribing clinician: 1/3/2024     Additional details provided by patient: PATIENT IS COMPLETELY OUT OF THIS MEDICATION    PATIENT PROVIDED HIS BLOOD PRESSURE READINGS, AND THEY ARE AS FOLLOWS    DECEMBER 8TH: 24/78 - 23/75 DECEMBER 9TH: 119/70 - 120/72    DECEMBER 10TH: 119/70 - 120/70        Does the patient have less than a 3 day supply:  [x] Yes  [] No    Would you like a call back once the refill request has been completed: [] Yes [x] No    If the office needs to give you a call back, can they leave a voicemail: [] Yes [x] No    Irma Atkinson Rep   12/11/23 09:37 EST

## 2023-12-12 RX ORDER — DEXTROAMPHETAMINE SACCHARATE, AMPHETAMINE ASPARTATE, DEXTROAMPHETAMINE SULFATE AND AMPHETAMINE SULFATE 7.5; 7.5; 7.5; 7.5 MG/1; MG/1; MG/1; MG/1
1 TABLET ORAL EVERY 12 HOURS
Qty: 60 TABLET | Refills: 0 | Status: SHIPPED | OUTPATIENT
Start: 2023-12-12

## 2023-12-12 NOTE — TELEPHONE ENCOUNTER
I sent in a refill on his Adderall given good home BP checks.  Please make sure his next visit in Jan is for in-office for repeat BP check here and to get UDS uptodate with compliance. .

## 2024-01-02 ENCOUNTER — TELEPHONE (OUTPATIENT)
Dept: FAMILY MEDICINE CLINIC | Facility: CLINIC | Age: 29
End: 2024-01-02
Payer: MEDICAID

## 2024-01-02 NOTE — TELEPHONE ENCOUNTER
Caller: Thomas Perez    Relationship to patient: Self    Best call back number: [phone]    Chief complaint: 4 WEEK F/U    Type of visit: OFFICE VISIT    Requested date: 01/11/24    If rescheduling, when is the original appointment: 01/03/24    Additional notes: PATIENT STATED THAT TOMORROW APPOINTMENT IS CONFLICTING WITH WORK; PATIENT IS REQUESTING TO BE RESCHEDULED FOR 01/11/24 WHEN HIS MEDICATIONS WILL RUN OUT AND WOULD NEED TO GET REFILLS WITH HIS DRUG SCREENING DONE    PLEASE ADVISE ASAP

## 2024-01-03 NOTE — TELEPHONE ENCOUNTER
Caller: Thomas Perez    Relationship to patient: Self    Best call back number: [phone]     Patient is needing: PATIENT CALLED BACK TO SEE IF HE CAN RESCHEDULE JANUARY 11, OR IF HE CAN RESCHEDULE WITH ANOTHER PROVIDER TO DO THE URINE TEST.    PLEASE ADVISE.

## 2024-01-10 ENCOUNTER — OFFICE VISIT (OUTPATIENT)
Dept: FAMILY MEDICINE CLINIC | Facility: CLINIC | Age: 29
End: 2024-01-10
Payer: MEDICAID

## 2024-01-10 VITALS
BODY MASS INDEX: 25.9 KG/M2 | DIASTOLIC BLOOD PRESSURE: 90 MMHG | SYSTOLIC BLOOD PRESSURE: 132 MMHG | OXYGEN SATURATION: 99 % | HEART RATE: 108 BPM | HEIGHT: 76 IN | WEIGHT: 212.7 LBS

## 2024-01-10 DIAGNOSIS — R03.0 ELEVATED BLOOD PRESSURE READING: ICD-10-CM

## 2024-01-10 DIAGNOSIS — F90.9 ADULT ADHD: Primary | ICD-10-CM

## 2024-01-10 DIAGNOSIS — Z79.899 HIGH RISK MEDICATION USE: ICD-10-CM

## 2024-01-10 LAB
AMPHET+METHAMPHET UR QL: NEGATIVE
AMPHETAMINE INTERNAL CONTROL: NORMAL
AMPHETAMINES UR QL: NEGATIVE
BARBITURATE INTERNAL CONTROL: NORMAL
BARBITURATES UR QL SCN: NEGATIVE
BENZODIAZ UR QL SCN: NEGATIVE
BENZODIAZEPINE INTERNAL CONTROL: NORMAL
BUPRENORPHINE INTERNAL CONTROL: NORMAL
BUPRENORPHINE SERPL-MCNC: NEGATIVE NG/ML
CANNABINOIDS SERPL QL: NEGATIVE
COCAINE INTERNAL CONTROL: NORMAL
COCAINE UR QL: NEGATIVE
EXPIRATION DATE: NORMAL
Lab: NORMAL
MDMA (ECSTASY) INTERNAL CONTROL: NORMAL
MDMA UR QL SCN: NEGATIVE
METHADONE INTERNAL CONTROL: NORMAL
METHADONE UR QL SCN: NEGATIVE
METHAMPHETAMINE INTERNAL CONTROL: NORMAL
MORPHINE INTERNAL CONTROL: NORMAL
MORPHINE/OPIATES SCREEN, URINE: NEGATIVE
OXYCODONE INTERNAL CONTROL: NORMAL
OXYCODONE UR QL SCN: NEGATIVE
PCP UR QL SCN: NEGATIVE
PHENCYCLIDINE INTERNAL CONTROL: NORMAL
THC INTERNAL CONTROL: NORMAL

## 2024-01-10 PROCEDURE — 80305 DRUG TEST PRSMV DIR OPT OBS: CPT | Performed by: PHYSICIAN ASSISTANT

## 2024-01-10 PROCEDURE — 99213 OFFICE O/P EST LOW 20 MIN: CPT | Performed by: PHYSICIAN ASSISTANT

## 2024-01-10 NOTE — ASSESSMENT & PLAN NOTE
Patient blood pressure elevated this date along with pulse.  He reports normal readings at home advised him to bring his blood pressure cuff to the office to compare with manual readings.  Again discussed members of hypertension.  Return to clinic should it remain elevated.

## 2024-01-10 NOTE — ASSESSMENT & PLAN NOTE
While patient reports taking his medication daily, patient UDS was negative for amphetamines this date.  Recommended confirmation testing via urine or blood however patient declined.  Given his second negative UDS along with elevated blood pressure patient is not a candidate to continue Adderall at this time.  Offered patient a referral to psychiatry for further follow-up with ADHD or noncontrolled routes symptom management however patient declined.

## 2024-01-10 NOTE — PROGRESS NOTES
"Chief Complaint  ADHD    Subjective        Thomas Perez presents to [facility]  History of Present Illness  Patient reports today for medication refills.  Patient reports taking Adderall for ADHD.  Patient states he takes his medication daily.  Patient reports having elevated blood pressure last visit however states he monitors at home and it has been below 130/80.      Objective   Vital Signs:  /90 (BP Location: Right arm)   Pulse 108   Ht 193 cm (76\")   Wt 96.5 kg (212 lb 11.2 oz)   SpO2 99%   BMI 25.89 kg/m²   Estimated body mass index is 25.89 kg/m² as calculated from the following:    Height as of this encounter: 193 cm (76\").    Weight as of this encounter: 96.5 kg (212 lb 11.2 oz).             Physical Exam  Vitals and nursing note reviewed.   Constitutional:       General: He is not in acute distress.     Appearance: Normal appearance.   HENT:      Head: Normocephalic.      Right Ear: Hearing normal.      Left Ear: Hearing normal.   Eyes:      Pupils: Pupils are equal, round, and reactive to light.   Cardiovascular:      Rate and Rhythm: Normal rate and regular rhythm.   Pulmonary:      Effort: Pulmonary effort is normal. No respiratory distress.   Skin:     General: Skin is warm and dry.   Neurological:      Mental Status: He is alert.   Psychiatric:         Mood and Affect: Mood normal.        Result Review :                   Assessment and Plan   Diagnoses and all orders for this visit:    1. Adult ADHD (Primary)  Assessment & Plan:  While patient reports taking his medication daily, patient UDS was negative for amphetamines this date.  Recommended confirmation testing via urine or blood however patient declined.  Given his second negative UDS along with elevated blood pressure patient is not a candidate to continue Adderall at this time.  Offered patient a referral to psychiatry for further follow-up with ADHD or noncontrolled routes symptom management however " patient declined.      2. High risk medication use  Assessment & Plan:  See plan above.  Patient UDS negative    Orders:  -     POC [facility] 12 Panel Urine Drug Screen    3. Elevated blood pressure reading  Assessment & Plan:  Patient blood pressure elevated this date along with pulse.  He reports normal readings at home advised him to bring his blood pressure cuff to the office to compare with manual readings.  Again discussed members of hypertension.  Return to clinic should it remain elevated.               Follow Up   No follow-ups on file.  Patient was given instructions and counseling regarding his condition or for health maintenance advice. Please see specific information pulled into the AVS if appropriate.

## 2024-02-13 NOTE — PROGRESS NOTES
Chief Complaint  ADHD and Anxiety    Subjective          Thomas Perez presents to [facility]  Patient comes in today to get his medications refilled he also states that his sertraline has been causing him quite a bit of nausea he has not been taking it with meals though and does not know if he needs to change it or he says it has helped but he does not know what to do presently he does need his ADD meds refilled and he states that he continues taking his finasteride for his alopecia.  Patient does give consent to have his visit done through telephone today      Objective   Vital Signs:   There were no vitals taken for this visit.    There is no height or weight on file to calculate BMI.    Review of Systems   Constitutional: Negative.    HENT: Negative for congestion, dental problem, ear discharge, ear pain and sore throat.    Respiratory: Negative for apnea, chest tightness and shortness of breath.    Gastrointestinal: Negative for constipation and nausea.   Endocrine: Negative for polyuria.   Genitourinary: Negative for difficulty urinating.   Musculoskeletal: Negative for arthralgias and gait problem.   Skin: Negative for rash.        Hair loss   Hematological: Negative for adenopathy.   Psychiatric/Behavioral: Positive for decreased concentration. The patient is nervous/anxious.        Past History:  Medical History: has a past medical history of Anxiety, Gilbert's disease, IBS (irritable bowel syndrome), and Liver disorder.   Surgical History: has no past surgical history on file.         Current Outpatient Medications:   •  amphetamine-dextroamphetamine (ADDERALL) 30 MG tablet, Take 1 tablet by mouth Every 12 (Twelve) Hours., Disp: 60 tablet, Rfl: 0  •  finasteride (PROPECIA) 1 MG tablet, Take 1 tablet by mouth Daily., Disp: 90 tablet, Rfl: 1  •  sertraline (ZOLOFT) 50 MG tablet, Take 1 tablet by mouth Daily., Disp: , Rfl:     Allergies: Penicillins, Sulfa antibiotics,  Cephalexin, Sulfamethoxazole, and Trimethoprim    Physical Exam  Neurological:      Mental Status: He is alert and oriented to person, place, and time.   Psychiatric:         Mood and Affect: Mood normal.         Behavior: Behavior normal.         Thought Content: Thought content normal.         Judgment: Judgment normal.          Result Review :                   Assessment and Plan    Diagnoses and all orders for this visit:    1. Anxiety (Primary)  Comments:  Discussed medication changes he does not want to do that he will try his meds with food and monitor    2. 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  Dispense: 60 tablet; Refill: 0    3. Alopecia  Comments:  Tinea present dose meds      I spent 20 minutes caring for Thomas on this date of service. This time includes time spent by me in the following activities:reviewing tests, counseling and educating the patient/family/caregiver, ordering medications, tests, or procedures and documenting information in the medical record        Follow Up   Return in about 3 months (around 8/12/2022).  Patient was given instructions and counseling regarding his condition or for health maintenance advice. Please see specific information pulled into the AVS if appropriate.     Jorge A Cadena MD   No